# Patient Record
Sex: MALE | Race: WHITE
[De-identification: names, ages, dates, MRNs, and addresses within clinical notes are randomized per-mention and may not be internally consistent; named-entity substitution may affect disease eponyms.]

---

## 2017-04-06 ENCOUNTER — HOSPITAL ENCOUNTER (EMERGENCY)
Dept: HOSPITAL 49 - FER | Age: 77
Discharge: HOME | End: 2017-04-06
Payer: MEDICARE

## 2017-04-06 DIAGNOSIS — Z95.1: ICD-10-CM

## 2017-04-06 DIAGNOSIS — Z79.82: ICD-10-CM

## 2017-04-06 DIAGNOSIS — Z79.02: ICD-10-CM

## 2017-04-06 DIAGNOSIS — I10: Primary | ICD-10-CM

## 2017-04-06 DIAGNOSIS — Z79.899: ICD-10-CM

## 2017-04-06 DIAGNOSIS — J44.9: ICD-10-CM

## 2017-04-06 DIAGNOSIS — I45.2: ICD-10-CM

## 2017-04-06 LAB
ALBUMIN SERPL-MCNC: 4.3 G/DL (ref 3.4–4.8)
ALKALINE PHOSHATASE: 70 U/L (ref 59–141)
ALT SERPL-CCNC: 15 U/L (ref 2–40)
AST: 15 U/L (ref 0–37)
BASOPHIL: 0.6 % (ref 0–2)
BILIRUBIN - TOTAL: 0.3 MG/DL (ref 0.1–1)
BILIRUBIN: NEGATIVE MG/DL
BLOOD: NEGATIVE ERY/UL
BUN SERPL-MCNC: 26 MG/DL (ref 6–25)
BUN/CREAT RATIO (CALC): 17 (ref 12.1–20.1)
CHLORIDE: 104 MMOL/L (ref 98–107)
CK MB SERPL-MCNC: 3.28 NG/ML (ref 0.97–4.94)
CK SERPL-CCNC: 75 U/L (ref 38–174)
CLARITY UR: CLEAR
CO2 (BICARBONATE): 24 MMOL/L (ref 23–33)
COLOR: YELLOW
CREATININE: 1.5 MG/DL (ref 0.7–1.2)
EOSINOPHIL: 4 % (ref 0–7)
GLOBULIN (CALCULATION): 2.5 G/DL (ref 2.2–4.2)
GLUCOSE (U): NORMAL MG/DL
GLUCOSE SERPL-MCNC: 98 MG/DL (ref 83–110)
HCT: 40.9 % (ref 42–52)
HGB BLD-MCNC: 13.1 G/DL (ref 13.2–18)
KETONE (U): NEGATIVE MG/DL
LEUKOCYTES: NEGATIVE LEU/UL
LYMPHOCYTE: 30.2 % (ref 15–48)
MCH RBC QN AUTO: 28.2 PG (ref 25–31)
MCHC RBC AUTO-ENTMCNC: 32 G/DL (ref 32–36)
MCV: 88.1 FL (ref 78–100)
MONOCYTE: 9.3 % (ref 0–12)
MPV: 11.3 FL (ref 6–9.5)
NEUTROPHIL: 55.9 % (ref 41–80)
NITRITE: NEGATIVE MG/DL
PLT: 197 K/UL (ref 150–400)
POTASSIUM: 3.9 MMOL/L (ref 3.5–5.1)
PROTEIN: (no result) MG/DL
RBC MORPHOLOGY: NORMAL
RBC: 4.64 M/UL (ref 4.7–6)
RDW: 15 % (ref 11.5–14)
SPECIFIC GRAVITY: >=1.03 (ref 1–1.03)
SQUAMOUS EPITHELIAL CELLS: (no result)
TOTAL PROTEIN: 6.8 G/DL (ref 6.4–8.3)
TROPONIN T: < 0.01 NG/ML
UROBILINOGEN: 0.2 MG/DL (ref 0.2–1)
WBC: 7.7 K/UL (ref 4–10.5)

## 2018-01-18 ENCOUNTER — OFFICE VISIT CONVERTED (OUTPATIENT)
Dept: FAMILY MEDICINE CLINIC | Age: 78
End: 2018-01-18
Attending: FAMILY MEDICINE

## 2018-06-18 ENCOUNTER — OFFICE VISIT CONVERTED (OUTPATIENT)
Dept: FAMILY MEDICINE CLINIC | Age: 78
End: 2018-06-18
Attending: FAMILY MEDICINE

## 2020-11-10 ENCOUNTER — OFFICE VISIT CONVERTED (OUTPATIENT)
Dept: GASTROENTEROLOGY | Facility: CLINIC | Age: 80
End: 2020-11-10
Attending: NURSE PRACTITIONER

## 2020-11-10 ENCOUNTER — CONVERSION ENCOUNTER (OUTPATIENT)
Dept: GASTROENTEROLOGY | Facility: CLINIC | Age: 80
End: 2020-11-10

## 2021-05-10 NOTE — H&P
History and Physical      Patient Name: Tashi Palma   Patient ID: [id number]   Sex: Male   YOB: 1940        Visit Date: November 10, 2020    Provider: LEON Adkins   Location: [facility]   Location Address: [address] [place] [address], [address]  [place], [place]  [id number]   Location Phone: [phone]          Chief Complaint  · Constipation      History Of Present Illness     Mr. Palma is a 80 year old male with PMH of MI, Alzheimer's and colon cancer who presents for evaluation of constipation.      His daughter is present at today's visit and helps provide history.  She is his primary caregiver.      Constipation has been present for several years. He reports that he has a bowel movement about once per week.  Denies rectal bleeding.  Admits straining and has had to manually remove stool recently.  He is taking 4 stool softeners per day.  Also has episodes of diarrhea and sometimes doesn't make it to the bathroom.  Daughter reports foul smelling stool x 2 years.  States that if he has an accident in clothes, it's impossible to get the smell out.      He denies dyspepsia, however his daughter reports that he belches frequently.      She reports that he had a breath test per PCP and was dx with H.pylori about 6 weeks ago.  Treated per PCP, but hasn't retested.      Colon resection in 2007 secondary to colon cancer.      Previous colonoscopy 6/13/2019 in [place] - moderate diverticulosis, previous surgery in the descending colon.       Past Medical History  Colon cancer; Colon Polyps; MI (myocardial infarction); Varicose vein of leg         Past Surgical History  Appendectomy; Carotid artery stenting; Colonoscopy; Open Heart Surgery; Stent placment         Medication List  amlodipine 5 mg oral tablet; Aspir-81 81 mg oral tablet,delayed release (DR/EC); clopidogrel 75 mg oral tablet; Colace 100 mg oral capsule; Eliquis 2.5 mg oral tablet; lansoprazole 30 mg oral  tablet,disintegrat, delay rel; levothyroxine 50 mcg oral tablet; memantine 5 mg oral tablet; olanzapine 2.5 mg oral tablet; sertraline 50 mg oral tablet; valsartan 160 mg oral tablet         Allergy List  NO KNOWN DRUG ALLERGIES       Allergies Reconciled  Family Medical History  No family history of colorectal cancer         Social History  Tobacco (Never)         Review of Systems  · Constitutional  o Denies  o : chills, fever  · Eyes  o Denies  o : blurred vision, changes in vision  · Cardiovascular  o Denies  o : chest pain, irregular heart beats  · Respiratory  o Denies  o : shortness of breath, cough  · Gastrointestinal  o Admits  o : See HPI  · Genitourinary  o Denies  o : dysuria, blood in urine  · Integument  o Denies  o : rash, new skin lesions  · Neurologic  o Denies  o : tingling or numbness, seizures  · Musculoskeletal  o Denies  o : joint pain, joint swelling  · Endocrine  o Denies  o : weight gain, weight loss  · Psychiatric  o Denies  o : anxiety, depression      Vitals  Date Time BP Position Site L\R Cuff Size HR RR TEMP (F) WT  HT  BMI kg/m2 BSA m2 O2 Sat FR L/min FiO2        11/10/2020 09:07 /59 Sitting    55 - R   200lbs 0oz                Physical Examination  · Constitutional  o Appearance  o : well developed, well-nourished, in no acute distress  · Eyes  o Vision  o :   § Visual Fields  § : eyes move symmetrical in all directions  o Sclerae  o : anicteric  o Pupils and Irises  o : pupils equal and symmetrical  · Neck  o Inspection/Palpation  o : supple  · Respiratory  o Respiratory Effort  o : breathing unlabored  o Inspection of Chest  o : normal appearance, no retractions  o Auscultation of Lungs  o : clear to auscultation bilaterally  · Cardiovascular  o Heart  o :   § Auscultation of Heart  § : no murmurs, gallops or rubs  · Gastrointestinal  o Abdominal Examination  o : soft, nontender to palpation, with normal active bowel sounds, no appreciable hepatosplenomegaly  o Digital  Rectal Exam  o : deferred  · Lymphatic  o Neck  o : no palpable lymphadenopathy  · Skin and Subcutaneous Tissue  o General Inspection  o : without focal lesions; turgor is normal  · Psychiatric  o General  o : Alert and oriented x3  o Mood and Affect  o : Mood and affect are appropriate to circumstances  · Extremities  o Extremities  o : No edema, no cyanosis          Assessment  · Constipation     564.00/K59.00  · Diarrhea     787.91/R19.7  · H. pylori infection     041.86/A04.8  · History of colon resection     V45.89/Z90.49  · History of colon cancer     V10.05/Z85.038      Plan  · Orders  o H pylori breath test (08029, 00989) - - 11/10/2020  · Medications  o Metamucil Sugar-Free (aspart) 3.4 gram/5.8 gram oral powder   SIG: use as directed by oral route daily   DISP: (1) Canister with 5 refills  Prescribed on 11/10/2020     o Florajen3 460 mg (7.5-6- 1.5 bill. cell) oral capsule   SIG: take 1 capsule by oral route daily for 30 days   DISP: (30) Capsule with 2 refills  Prescribed on 11/10/2020     o Medications have been Reconciled  · Instructions  o Information given on current diagnoses. Recommend starting fiber and probiotics. If no improvement, instructed daughter to call and can try rx. H. pylori breath test to ensure resolution.  · Disposition  o Follow up 2 months            Electronically Signed by: LEON Adkins -Author on November 10, 2020 09:17:30 PM

## 2021-05-14 VITALS — SYSTOLIC BLOOD PRESSURE: 189 MMHG | DIASTOLIC BLOOD PRESSURE: 59 MMHG | WEIGHT: 200 LBS | HEART RATE: 55 BPM

## 2021-05-18 NOTE — PROGRESS NOTES
Tashi Palma 1940     Office/Outpatient Visit    Visit Date: Mon, Jun 18, 2018 10:52 am    Provider: Anand Zarate MD (Assistant: Armida Colmenares MA)    Location: [facility], [place]        Electronically signed by Anand Zarate MD on  06/19/2018 07:37:55 AM                             SUBJECTIVE:        CC: blood pressure, thyroid, dementia         HPI:         Dx with hypertension; his current cardiac medication regimen includes a calcium channel blocker ( Norvasc ) and an angiotensin receptor blocker ( Cozaar ).  He did not bring his blood pressure diary, but says that pressures have been okay.  He is tolerating the medication well without side effects.  Compliance with treatment has been good; he takes his medication as directed.          Concerning acquired hypothyroidism, other specified cause, he is currently taking Synthroid, 25 mcg daily.  TSH was last checked 5 months ago.  The result was reported as high.      ROS:     CONSTITUTIONAL:  Negative for chills and fever.      CARDIOVASCULAR:  Negative for chest pain and palpitations.      RESPIRATORY:  Negative for recent cough and dyspnea.      GASTROINTESTINAL:  Negative for abdominal pain, nausea and vomiting.      INTEGUMENTARY:  Negative for atypical mole(s) and rash.          PMH/FMH/SH:     Last Reviewed on 1/18/2018 03:47 PM by Anand Zarate    Past Medical History:         Carotid Artery Stenosis: bilateral;     Hyperlipidemia: Hypercholesterolemia; Hypertriglyceridemia;     Hypertension    Peripheral Vascular Disease     Cerebrovascular Accident: x 1; in 2007;         PREVENTIVE HEALTH MAINTENANCE             COLORECTAL CANCER SCREENING: Up to date (colonoscopy q10y; sigmoidoscopy q5y; Cologuard q3y) was last done 04/2016, Results are in chart; colonoscopy with normal results; colonoscopy with the following abnormalities noted-- Diffuse Diverticulosis; 4/28/2016         Surgical History:         Appendectomy      Veins in  legs;    Partial Colectomy - isolated to a polyp - 2009;    CEA- Left; Procedures: colonoscopy 2010 - next due in 2013         Family History:         Positive for Coronary Artery Disease ( father ).          Social History:     Occupation: Retired (Prior occupation: ) lives with son in law and daughter     Marital Status:      Children: 3 children         Tobacco/Alcohol/Supplements:     Last Reviewed on 6/18/2018 10:52 AM by Armida Colmenares    Tobacco: He has a past history of cigarette smoking; quit date:  1980s.  Non-drinker         Substance Abuse History:     Last Reviewed on 1/18/2018 03:47 PM by Anand Zarate    NEGATIVE         Mental Health History:     Last Reviewed on 1/18/2018 03:47 PM by Anand Zarate        Communicable Diseases (eg STDs):     Last Reviewed on 1/18/2018 03:47 PM by Anand Zarate            Current Problems:     Last Reviewed on 1/18/2018 03:47 PM by Anand Zarate    Low back pain     Malaise and Fatigue     Memory loss     Chronic renal insufficiency, stage 2     Use of high risk medications     Carotid artery stenosis, with cerebral infarction     Moderate depression     Hypercholesterolemia     Hypertension         Immunizations:     Prevnar 13 (Pneumococcal PCV 13) 2/15/2016     Fluzone (3 + years dose) 11/5/2012     Fluzone High-Dose pf (>=65 yr) 10/24/2014     Fluzone High-Dose pf (>=65 yr) 2/15/2016     Fluzone High-Dose pf (>=65 yr) 9/22/2017     PNEUMOVAX 23 (Pneumococcal PPV23) 8/22/2017     Zostavax (Zoster) 7/12/2013         Allergies:     Last Reviewed on 6/18/2018 10:52 AM by Armida Colmenares      No Known Drug Allergies.         Current Medications:     Last Reviewed on 6/18/2018 10:52 AM by Armida Colmenares    Aricept 10mg Tablet Take 1 tablet(s) by mouth daily     Losartan 100mg Tablet Take 1 tablet(s) by mouth daily     Norvasc 5mg Tablet Take 1 tablet(s) by mouth bid     Plavix 75mg Tablet Take 1 tablet(s) by  mouth daily     Sertraline HCl 50mg Tablet Take 1 tablet(s) by mouth daily     Synthroid 0.025mg Tablet One tablet daily     Aspirin (ASA) 81mg Tablets, Enteric Coated Take 1 tablet(s) by mouth daily         OBJECTIVE:        Vitals:         Current: 6/18/2018 10:55:33 AM    Ht:  5 ft, 9 in;  Wt: 198.2 lbs;  BMI: 29.3    T: 99 F (oral);  BP: 155/67 mm Hg (left arm, sitting);  P: 63 bpm (left arm (BP Cuff), sitting);  sCr: 1.6 mg/dL;  GFR: 37.17        Exams:     PHYSICAL EXAM:     GENERAL: Vitals recorded well developed, well nourished;     EYES: extraocular movements intact; conjunctiva and cornea are normal; PERRL;     E/N/T: EARS:  normal external auditory canals and tympanic membranes;  grossly normal hearing; OROPHARYNX:  normal mucosa, dentition, gingiva, and posterior pharynx;     NECK: range of motion is normal; thyroid is non-palpable;     RESPIRATORY: normal respiratory rate and pattern with no distress; normal breath sounds with no rales, rhonchi, wheezes or rubs;     CARDIOVASCULAR: normal rate; rhythm is regular;  no systolic murmur;     GASTROINTESTINAL: nontender; normal bowel sounds; no masses;     SKIN:  no significant rashes or lesions; no suspicious moles;     NEUROLOGIC: mental status: alert and oriented x 3; cranial nerves II-XII grossly intact;         ASSESSMENT           585.2   N18.2  Chronic renal insufficiency, stage 2              DDx:     401.1   I10  Hypertension              DDx:     780.93   R41.82  Memory loss              DDx:     244.8   E03.8  Acquired hypothyroidism, other specified cause              DDx:         ORDERS:         Meds Prescribed:       Refill of: Aricept (Donepezil HCl) 10mg Tablet Take 1 tablet(s) by mouth daily  #90 (Ninety) tablet(s) Refills: 1       Refill of: Losartan 100mg Tablet Take 1 tablet(s) by mouth daily  #90 (Ninety) tablet(s) Refills: 1       Refill of: Norvasc (Amlodipine ) 5mg Tablet Take 1 tablet(s) by mouth bid  #180 ([address])  tablet(s) Refills: 1       Refill of: Plavix (Clopidogrel) 75mg Tablet Take 1 tablet(s) by mouth daily  #90 (Ninety) tablet(s) Refills: 1       Refill of: Sertraline HCl 50mg Tablet Take 1 tablet(s) by mouth daily  #90 (Ninety) tablet(s) Refills: 1         Lab Orders:       [id number]  [address] CBC w/o diff  (Send-Out)         [id number]  COMP - [facility] Comp. Metabolic Panel  (Send-Out)         35208  TSH - [facility] TSH  (Send-Out)                   PLAN:          Chronic renal insufficiency, stage 2     LABORATORY:  Labs ordered to be performed today include CBC W/O DIFF.      RECOMMENDATIONS given include: Today, we have reviewed Tashi's care.  I'm going to continue his current medications and recheck labs as noted below.  No changes are anticipated.  He brings a form with regard to driving.  It is my advice that he not drive, but he would like to move in that direction.  We will refer him to neurology for evaluation at his request..            Orders:       [id number]  [address] CBC w/o diff  (Send-Out)             Patient Education Handouts:       [facility]           Hypertension     LABORATORY:  Labs ordered to be performed today include Comprehensive metabolic panel.            Prescriptions:       Refill of: Losartan 100mg Tablet Take 1 tablet(s) by mouth daily  #90 (Ninety) tablet(s) Refills: 1       Refill of: Norvasc (Amlodipine ) 5mg Tablet Take 1 tablet(s) by mouth bid  #180 ([address]) tablet(s) Refills: 1           Orders:       [id number]  COMP [facility] Comp. Metabolic Panel  (Send-Out)            Memory loss As above.           Prescriptions:       Refill of: Aricept (Donepezil HCl) 10mg Tablet Take 1 tablet(s) by mouth daily  #90 (Ninety) tablet(s) Refills: 1          Acquired hypothyroidism, other specified cause     LABORATORY:  Labs ordered to be performed today include TSH.            Orders:       [id number]  TSH - [facility] TSH  (Send-Out)               Other Prescriptions:       Refill of: Plavix  (Clopidogrel) 75mg Tablet Take 1 tablet(s) by mouth daily  #90 (Ninety) tablet(s) Refills: 1       Refill of: Sertraline HCl 50mg Tablet Take 1 tablet(s) by mouth daily  #90 (Ninety) tablet(s) Refills: 1         CHARGE CAPTURE           **Please note: ICD descriptions below are intended for billing purposes only and may not represent clinical diagnoses**        Primary Diagnosis:         585.2 Chronic renal insufficiency, stage 2            N18.2    Chronic kidney disease, stage 2 (mild)              Orders:          [id number]   Office/outpatient visit; established patient, level 4  (In-House)           401.1 Hypertension            I10    Essential (primary) hypertension    780.93 Memory loss            R41.82    Altered mental status, unspecified    244.8 Acquired hypothyroidism, other specified cause            E03.8    Other specified hypothyroidism        ADDENDUMS:      ____________________________________    Addendum: 06/19/2018 10:00 AM -          Visit Note Faxed to:        Jarret Garcia (Neurology); Number [phone]

## 2021-05-18 NOTE — PROGRESS NOTES
Tashi Palma 1940     Office/Outpatient Visit    Visit Date: Thu, Jan 18, 2018 03:40 pm    Provider: Anand Zarate MD (Assistant: Armida Colmenares MA)    Location: [facility]        Electronically signed by Anand Zarate MD on  01/19/2018 05:29:43 AM                             SUBJECTIVE:        CC: 'we need to talk'         HPI:     Tashi is in today for follow up on a few issues.  He has been having more of an issue with the memory in the last bit.  His daughter is not sure what has contributed to this, but it is becoming more of a practical issue.  He is not driving now.  He did take off one day over [place] break and drove over 200 miles.  He went all the way to [place] and happened to run into a niece there.         Hypercholesterolemia details; current treatment includes Lipitor.  Compliance with treatment has been good; he follows up as directed.  He denies experiencing any hypercholesterolemia related symptoms.          Tashi has had some issue with depression.  He does have some days where his emotions will vary.  His daughter says that he does have 'angry days'.         Dx with hypertension; his current cardiac medication regimen includes a calcium channel blocker ( Norvasc ) and an angiotensin receptor blocker ( Cozaar ).  He did not bring his blood pressure diary, but says that pressures have been okay.  He is tolerating the medication well without side effects.  Compliance with treatment has been good; he takes his medication as directed.      ROS:     CONSTITUTIONAL:  Negative for chills and fever.      CARDIOVASCULAR:  Negative for chest pain and palpitations.      RESPIRATORY:  Negative for recent cough and dyspnea.      GASTROINTESTINAL:  Negative for abdominal pain, nausea and vomiting.      INTEGUMENTARY:  Negative for atypical mole(s) and rash.          PMH/FMH/SH:     Last Reviewed on 1/18/2018 03:47 PM by Anand Zarate    Past Medical History:          Carotid Artery Stenosis: bilateral;     Hyperlipidemia: Hypercholesterolemia; Hypertriglyceridemia;     Hypertension    Peripheral Vascular Disease     Cerebrovascular Accident: x 1; in 2007;         PREVENTIVE HEALTH MAINTENANCE             COLORECTAL CANCER SCREENING: Up to date (colonoscopy q10y; sigmoidoscopy q5y; Cologuard q3y) was last done 04/2016, Results are in chart; colonoscopy with normal results; colonoscopy with the following abnormalities noted-- Diffuse Diverticulosis; 4/28/2016         Surgical History:         Appendectomy      Veins in legs;    Partial Colectomy - isolated to a polyp - 2009;    CEA- Left; Procedures: colonoscopy 2010 - next due in 2013         Family History:         Positive for Coronary Artery Disease ( father ).          Social History:     Occupation: Retired (Prior occupation: ) lives with son in law and daughter     Marital Status:      Children: 3 children         Tobacco/Alcohol/Supplements:     Last Reviewed on 1/18/2018 03:47 PM by Anand Zarate    Tobacco: He has a past history of cigarette smoking; quit date:  1980s.  Non-drinker         Substance Abuse History:     Last Reviewed on 1/18/2018 03:47 PM by Anand Zarate    NEGATIVE         Mental Health History:     Last Reviewed on 1/18/2018 03:47 PM by Anand Zarate        Communicable Diseases (eg STDs):     Last Reviewed on 1/18/2018 03:47 PM by Anand Zarate            Current Problems:     Last Reviewed on 1/18/2018 03:47 PM by Anand Zarate    Low back pain     Malaise and Fatigue     Memory loss     Chronic renal insufficiency, stage 2     Use of high risk medications     Carotid artery stenosis, with cerebral infarction     Moderate depression     Hypercholesterolemia     Hypertension         Immunizations:     Prevnar 13 (Pneumococcal PCV 13) 2/15/2016     Fluzone (3 + years dose) 11/5/2012     Fluzone High-Dose pf (>=65 yr) 10/24/2014     Fluzone  High-Dose pf (>=65 yr) 2/15/2016     Fluzone High-Dose pf (>=65 yr) 9/22/2017     PNEUMOVAX 23 (Pneumococcal PPV23) 8/22/2017     Zostavax (Zoster) 7/12/2013         Allergies:     Last Reviewed on 1/18/2018 03:47 PM by Anand Zarate      No Known Drug Allergies.         Current Medications:     Last Reviewed on 1/18/2018 03:47 PM by Anand Zarate    Losartan 100mg Tablet Take 1 tablet(s) by mouth daily     Norvasc 5mg Tablet Take 1 tablet(s) by mouth bid     Plavix 75mg Tablet Take 1 tablet(s) by mouth daily     Sertraline HCl 50mg Tablet Take 1 tablet(s) by mouth daily     Aspirin (ASA) 81mg Tablets, Enteric Coated Take 1 tablet(s) by mouth daily         OBJECTIVE:        Vitals:         Current: 1/18/2018 3:43:13 PM    Ht:  5 ft, 9 in;  Wt: 204.5 lbs;  BMI: 30.2    T: 97.9 F (oral);  BP: 152/62 mm Hg (left arm, sitting);  P: 57 bpm (left arm (BP Cuff), sitting);  sCr: 1.73 mg/dL;  GFR: 35.39        Exams:     PHYSICAL EXAM:     GENERAL: Vitals recorded well developed, well nourished;     EYES: extraocular movements intact; conjunctiva and cornea are normal; PERRL;     E/N/T: EARS:  normal external auditory canals and tympanic membranes;  grossly normal hearing; OROPHARYNX:  normal mucosa, dentition, gingiva, and posterior pharynx;     NECK: range of motion is normal; thyroid is non-palpable;     RESPIRATORY: normal respiratory rate and pattern with no distress; normal breath sounds with no rales, rhonchi, wheezes or rubs;     CARDIOVASCULAR: normal rate; rhythm is regular;  no systolic murmur;     GASTROINTESTINAL: nontender; normal bowel sounds; no masses;     SKIN:  no significant rashes or lesions; no suspicious moles;     NEUROLOGIC: mental status: oriented to person only;  cranial nerves II-XII grossly intact; Mini-Mental State Exam score is 17/30         ASSESSMENT           780.93   R41.82  Memory loss              DDx:     585.2   N18.2  Chronic renal insufficiency, stage 2               DDx:     272.0   E78.4  Hypercholesterolemia              DDx:     296.22   F32.1  Moderate depression              DDx:     401.1   I10  Hypertension              DDx:     433.11   I63.239  Carotid artery stenosis, with cerebral infarction              DDx:     788.33   N39.46  Mixed urinary incontinence              DDx:         ORDERS:         Meds Prescribed:       Refill of: Losartan 100mg Tablet Take 1 tablet(s) by mouth daily  #90 (Ninety) tablet(s) Refills: 1       Refill of: Norvasc (Amlodipine ) 5mg Tablet Take 1 tablet(s) by mouth bid  #180 (One [place] and Eighty) tablet(s) Refills: 1       Refill of: Plavix (Clopidogrel) 75mg Tablet Take 1 tablet(s) by mouth daily  #90 (Ninety) tablet(s) Refills: 1       Refill of: Sertraline HCl 50mg Tablet Take 1 tablet(s) by mouth daily  #90 (Ninety) tablet(s) Refills: 1       Aricept (Donepezil HCl) 5mg Tablet Take 1 tablet(s) by mouth at bedtime  #30 (Thirty) tablet(s) Refills: 0         Lab Orders:       [id number]  HTNLP - [facility] CMP AND LIPID: [id number]  (Send-Out)         [id number]  TSH - [facility] TSH  (Send-Out)         [id number]  BDUAM - [facility] Urinalysis, automated, with micro  (Send-Out)                   PLAN:          Memory loss         RECOMMENDATIONS given include: Today, we have reviewed Tashi's care.  I do think he has some degree of Alzheimer's disease.  We will begin some treatment aimed in this.  We will see how he responds to this and try to be fairly aggressive.  I do agree that she should NOT drive moving forward.  We will check labs and go from there..            Prescriptions:       Aricept (Donepezil HCl) 5mg Tablet Take 1 tablet(s) by mouth at bedtime  #30 (Thirty) tablet(s) Refills: 0           Patient Education Handouts:       Alzheimer's Disease           Chronic renal insufficiency, stage 2     LABORATORY:  Labs ordered to be performed today include HTN/Lipid Panel: CMP, Lipid.            Orders:       [id number]  [facility]NLP - [facility] CMP AND LIPID: [id number]   (Send-Out)            Hypercholesterolemia     LABORATORY:  Labs ordered to be performed today include TSH.            Prescriptions:       Refill of: Losartan 100mg Tablet Take 1 tablet(s) by mouth daily  #90 (Ninety) tablet(s) Refills: 1       Refill of: Norvasc (Amlodipine ) 5mg Tablet Take 1 tablet(s) by mouth bid  #180 (One [place] and Eighty) tablet(s) Refills: 1           Orders:       [id number]  [facility] TSH  (Send-Out)            Moderate depression As above.           Prescriptions:       Refill of: Sertraline HCl 50mg Tablet Take 1 tablet(s) by mouth daily  #90 (Ninety) tablet(s) Refills: 1          Hypertension As above.          Carotid artery stenosis, with cerebral infarction As above.           Prescriptions:       Refill of: Plavix (Clopidogrel) 75mg Tablet Take 1 tablet(s) by mouth daily  #90 (Ninety) tablet(s) Refills: 1          Mixed urinary incontinence     LABORATORY:  Labs ordered to be performed today include urinalysis with micro.            Orders:       [id number]  [facility] Urinalysis, automated, with micro  (Send-Out)               CHARGE CAPTURE           **Please note: ICD descriptions below are intended for billing purposes only and may not represent clinical diagnoses**        Primary Diagnosis:         780.93 Memory loss            R41.82    Altered mental status, unspecified              Orders:          [id number]   Office/outpatient visit; established patient, level 4  (In-House)           585.2 Chronic renal insufficiency, stage 2            N18.2    Chronic kidney disease, stage 2 (mild)    272.0 Hypercholesterolemia            E78.4    Other hyperlipidemia    296.22 Moderate depression            F32.1    Major depressive disorder, single episode, moderate    401.1 Hypertension            I10    Essential (primary) hypertension    433.11 Carotid artery stenosis, with cerebral infarction            I63.239    Cerebral infarction due to unspecified occlusion or stenosis of unspecified  carotid arteries    788.33 Mixed urinary incontinence            N39.46    Mixed incontinence        ADDENDUMS:      ____________________________________    Addendum: 06/19/2018 10:00 AM -          Visit Note Faxed to:        Jarret Garcia (Neurology); Number [phone]

## 2021-07-01 VITALS
HEART RATE: 57 BPM | SYSTOLIC BLOOD PRESSURE: 152 MMHG | TEMPERATURE: 97.9 F | BODY MASS INDEX: 30.29 KG/M2 | WEIGHT: 204.5 LBS | DIASTOLIC BLOOD PRESSURE: 62 MMHG | HEIGHT: 69 IN

## 2021-07-01 VITALS
BODY MASS INDEX: 29.36 KG/M2 | SYSTOLIC BLOOD PRESSURE: 155 MMHG | DIASTOLIC BLOOD PRESSURE: 67 MMHG | WEIGHT: 198.2 LBS | HEIGHT: 69 IN | TEMPERATURE: 99 F | HEART RATE: 63 BPM

## 2021-10-04 ENCOUNTER — HOSPITAL ENCOUNTER (EMERGENCY)
Dept: HOSPITAL 49 - FER | Age: 81
LOS: 2 days | Discharge: OTHER INSTITUTION | End: 2021-10-06
Payer: MEDICARE

## 2021-10-04 DIAGNOSIS — I25.2: ICD-10-CM

## 2021-10-04 DIAGNOSIS — F03.90: ICD-10-CM

## 2021-10-04 DIAGNOSIS — Z79.01: ICD-10-CM

## 2021-10-04 DIAGNOSIS — Z86.73: ICD-10-CM

## 2021-10-04 DIAGNOSIS — Z79.82: ICD-10-CM

## 2021-10-04 DIAGNOSIS — R00.1: ICD-10-CM

## 2021-10-04 DIAGNOSIS — R55: Primary | ICD-10-CM

## 2021-10-04 DIAGNOSIS — Z20.822: ICD-10-CM

## 2021-10-04 LAB
ALBUMIN SERPL-MCNC: 3.5 G/DL (ref 3.4–5)
ALKALINE PHOSHATASE: 100 U/L (ref 46–116)
ALT SERPL-CCNC: 19 U/L (ref 16–63)
AST: 12 U/L (ref 15–37)
BASOPHIL: 1 % (ref 0–2)
BILIRUBIN - TOTAL: 0.3 MG/DL (ref 0.2–1)
BUN SERPL-MCNC: 30 MG/DL (ref 7–18)
BUN/CREAT RATIO (CALC): 16.5 RATIO
CHLORIDE: 110 MMOL/L (ref 98–107)
CO2 (BICARBONATE): 25 MMOL/L (ref 21–32)
CREATININE: 1.82 MG/DL (ref 0.67–1.17)
EOSINOPHIL: 1.9 % (ref 0–7)
GLOBULIN (CALCULATION): 3.1 G/DL
GLUCOSE SERPL-MCNC: 129 MG/DL (ref 74–106)
HCT: 37.9 % (ref 42–52)
HGB BLD-MCNC: 11.6 G/DL (ref 13.2–18)
LYMPHOCYTE: 16.6 % (ref 15–48)
MCH RBC QN AUTO: 28.7 PG (ref 25–31)
MCHC RBC AUTO-ENTMCNC: 30.6 G/DL (ref 32–36)
MCV: 93.8 FL (ref 78–100)
MONOCYTE: 6.9 % (ref 0–12)
MPV: 11.9 FL (ref 6–9.5)
NEUTROPHIL: 73.5 % (ref 41–80)
NRBC: 0.4
PLT: 155 K/UL (ref 150–400)
POTASSIUM: 3.7 MMOL/L (ref 3.5–5.1)
PRO-BNP: 407 PG/ML (ref ?–450)
RBC MORPHOLOGY: NORMAL
RBC: 4.04 M/UL (ref 4.7–6)
RDW: 15.6 % (ref 11.5–14)
TOTAL PROTEIN: 6.6 G/DL (ref 6.4–8.2)
WBC: 6.9 K/UL (ref 4–10.5)

## 2021-10-04 PROCEDURE — U0002 COVID-19 LAB TEST NON-CDC: HCPCS

## 2021-10-05 LAB
BILIRUBIN: NEGATIVE MG/DL
BLOOD: NEGATIVE ERY/UL
CLARITY UR: CLEAR
COLOR: YELLOW
GLUCOSE (U): NORMAL MG/DL
LEUKOCYTES: NEGATIVE LEU/UL
NITRITE: NEGATIVE MG/DL
PROTEIN: NEGATIVE MG/DL
SPECIFIC GRAVITY: 1.01 (ref 1–1.03)
UROBILINOGEN: 0.2 MG/DL (ref 0.2–1)

## 2021-12-26 ENCOUNTER — HOSPITAL ENCOUNTER (INPATIENT)
Dept: HOSPITAL 49 - FER | Age: 81
LOS: 3 days | Discharge: HOME HEALTH | DRG: 871 | End: 2021-12-29
Attending: INTERNAL MEDICINE | Admitting: INTERNAL MEDICINE
Payer: MEDICARE

## 2021-12-26 VITALS — WEIGHT: 188 LBS | BODY MASS INDEX: 26.92 KG/M2 | HEIGHT: 70 IN

## 2021-12-26 DIAGNOSIS — J12.82: ICD-10-CM

## 2021-12-26 DIAGNOSIS — R65.20: ICD-10-CM

## 2021-12-26 DIAGNOSIS — L89.156: ICD-10-CM

## 2021-12-26 DIAGNOSIS — N18.9: ICD-10-CM

## 2021-12-26 DIAGNOSIS — G93.41: ICD-10-CM

## 2021-12-26 DIAGNOSIS — Z90.49: ICD-10-CM

## 2021-12-26 DIAGNOSIS — G30.9: ICD-10-CM

## 2021-12-26 DIAGNOSIS — Z98.890: ICD-10-CM

## 2021-12-26 DIAGNOSIS — D50.9: ICD-10-CM

## 2021-12-26 DIAGNOSIS — J96.01: ICD-10-CM

## 2021-12-26 DIAGNOSIS — Z79.899: ICD-10-CM

## 2021-12-26 DIAGNOSIS — F02.80: ICD-10-CM

## 2021-12-26 DIAGNOSIS — U07.1: ICD-10-CM

## 2021-12-26 DIAGNOSIS — Z85.038: ICD-10-CM

## 2021-12-26 DIAGNOSIS — I45.2: ICD-10-CM

## 2021-12-26 DIAGNOSIS — R55: ICD-10-CM

## 2021-12-26 DIAGNOSIS — G47.33: ICD-10-CM

## 2021-12-26 DIAGNOSIS — Z79.890: ICD-10-CM

## 2021-12-26 DIAGNOSIS — Z79.01: ICD-10-CM

## 2021-12-26 DIAGNOSIS — I25.10: ICD-10-CM

## 2021-12-26 DIAGNOSIS — Z95.1: ICD-10-CM

## 2021-12-26 DIAGNOSIS — I12.9: ICD-10-CM

## 2021-12-26 DIAGNOSIS — A41.89: Primary | ICD-10-CM

## 2021-12-26 LAB
ALBUMIN SERPL-MCNC: 2.9 G/DL (ref 3.4–5)
ALKALINE PHOSHATASE: 68 U/L (ref 46–116)
ALT SERPL-CCNC: 30 U/L (ref 16–63)
AST: 54 U/L (ref 15–37)
BASOPHIL: 0.3 % (ref 0–2)
BILIRUBIN - TOTAL: 0.4 MG/DL (ref 0.2–1)
BUN SERPL-MCNC: 38 MG/DL (ref 7–18)
BUN/CREAT RATIO (CALC): 21.6 RATIO
CHLORIDE: 106 MMOL/L (ref 98–107)
CO2 (BICARBONATE): 24 MMOL/L (ref 21–32)
CORONAVIRUS 2019 SARS-COV-2: POSITIVE
CREATININE: 1.76 MG/DL (ref 0.67–1.17)
EOSINOPHIL: 0.3 % (ref 0–7)
GLOBULIN (CALCULATION): 3.5 G/DL
GLUCOSE SERPL-MCNC: 96 MG/DL (ref 74–106)
HCT: 34.3 % (ref 42–52)
HGB BLD-MCNC: 10.7 G/DL (ref 13.2–18)
INFLUENZA A NAA: NEGATIVE
LYMPHOCYTE: 11.2 % (ref 15–48)
MCH RBC QN AUTO: 28 PG (ref 25–31)
MCHC RBC AUTO-ENTMCNC: 31.2 G/DL (ref 32–36)
MCV: 89.8 FL (ref 78–100)
MONOCYTE: 6.5 % (ref 0–12)
MPV: 12.1 FL (ref 6–9.5)
NEUTROPHIL: 81.1 % (ref 41–80)
NRBC: 0
PLT: 158 K/UL (ref 150–400)
POTASSIUM: 4 MMOL/L (ref 3.5–5.1)
RBC MORPHOLOGY: NORMAL
RBC: 3.82 M/UL (ref 4.7–6)
RDW: 16 % (ref 11.5–14)
TOTAL PROTEIN: 6.4 G/DL (ref 6.4–8.2)
WBC: 7 K/UL (ref 4–10.5)

## 2021-12-26 PROCEDURE — U0002 COVID-19 LAB TEST NON-CDC: HCPCS

## 2021-12-26 PROCEDURE — C9399 UNCLASSIFIED DRUGS OR BIOLOG: HCPCS

## 2021-12-27 LAB
BACTERIA: (no result)
BILIRUBIN: NEGATIVE MG/DL
BLOOD: (no result) ERY/UL
CLARITY UR: CLEAR
COLOR: YELLOW
GLUCOSE (U): NORMAL MG/DL
LEUKOCYTES: NEGATIVE LEU/UL
NITRITE: NEGATIVE MG/DL
PROTEIN: (no result) MG/DL
SPECIFIC GRAVITY: 1.02 (ref 1–1.03)
SQUAMOUS EPITHELIAL CELLS: (no result)
URINARY RBC: (no result)
URINARY WBC: (no result)
UROBILINOGEN: 0.2 MG/DL (ref 0.2–1)

## 2021-12-27 PROCEDURE — XW033E5 INTRODUCTION OF REMDESIVIR ANTI-INFECTIVE INTO PERIPHERAL VEIN, PERCUTANEOUS APPROACH, NEW TECHNOLOGY GROUP 5: ICD-10-PCS | Performed by: INTERNAL MEDICINE

## 2021-12-27 PROCEDURE — 8E0ZXY6 ISOLATION: ICD-10-PCS | Performed by: INTERNAL MEDICINE

## 2021-12-28 LAB
BASOPHIL: 0.3 % (ref 0–2)
BUN SERPL-MCNC: 30 MG/DL (ref 7–18)
BUN/CREAT RATIO (CALC): 23.3 RATIO
CHLORIDE: 111 MMOL/L (ref 98–107)
CO2 (BICARBONATE): 21 MMOL/L (ref 21–32)
CREATININE: 1.29 MG/DL (ref 0.67–1.17)
EOSINOPHIL: 0 % (ref 0–7)
FOLIC ACID (SERUM): 8.3 NG/ML (ref 8.6–58.9)
GLUCOSE SERPL-MCNC: 127 MG/DL (ref 74–106)
HCT: 32.7 % (ref 42–52)
HGB BLD-MCNC: 9.9 G/DL (ref 13.2–18)
IRON % SATURATION: 21.3 %SAT (ref 20–50)
IRON SERPL-MCNC: 29 UG/DL (ref 65–175)
LYMPHOCYTE: 31.7 % (ref 15–48)
MAGNESIUM SERPL-MCNC: 2.4 MG/DL (ref 1.8–2.4)
MCH RBC QN AUTO: 27.7 PG (ref 25–31)
MCHC RBC AUTO-ENTMCNC: 30.3 G/DL (ref 32–36)
MCV: 91.3 FL (ref 78–100)
MONOCYTE: 7.5 % (ref 0–12)
MPV: 12.3 FL (ref 6–9.5)
NEUTROPHIL: 59.8 % (ref 41–80)
NRBC: 0
PHOSPHORUS: 2.9 MG/DL (ref 2.6–4.7)
PLT: 168 K/UL (ref 150–400)
POTASSIUM: 4.3 MMOL/L (ref 3.5–5.1)
RBC MORPHOLOGY: NORMAL
RBC: 3.58 M/UL (ref 4.7–6)
RDW: 16 % (ref 11.5–14)
RETICULOCYTE COUNT: 0.6 % (ref 1–2)
VITAMIN B12: 373 PG/ML (ref 193–986)
WBC: 3.1 K/UL (ref 4–10.5)

## 2021-12-29 NOTE — NUR
TC TO DAUGHTER, SHE DOES NOT HAVE A HH PREFERENCE.  THE ONLY ACCEPTING PT IS
[facility].  DAUGHTER WAS OK WITH THAT HH.  FAXED INFORMATION TO [facility]
WAITING ON ACCEPTANCE.

## 2024-03-31 ENCOUNTER — APPOINTMENT (OUTPATIENT)
Dept: GENERAL RADIOLOGY | Facility: HOSPITAL | Age: 84
End: 2024-03-31
Payer: MEDICARE

## 2024-03-31 ENCOUNTER — HOSPITAL ENCOUNTER (EMERGENCY)
Facility: HOSPITAL | Age: 84
Discharge: HOME | End: 2024-03-31
Attending: EMERGENCY MEDICINE | Admitting: EMERGENCY MEDICINE
Payer: MEDICARE

## 2024-03-31 ENCOUNTER — APPOINTMENT (OUTPATIENT)
Dept: CT IMAGING | Facility: HOSPITAL | Age: 84
End: 2024-03-31
Payer: MEDICARE

## 2024-03-31 VITALS
WEIGHT: 179.23 LBS | HEART RATE: 70 BPM | HEIGHT: 69 IN | TEMPERATURE: 98.6 F | OXYGEN SATURATION: 97 % | SYSTOLIC BLOOD PRESSURE: 144 MMHG | RESPIRATION RATE: 18 BRPM | BODY MASS INDEX: 26.55 KG/M2 | DIASTOLIC BLOOD PRESSURE: 51 MMHG

## 2024-03-31 DIAGNOSIS — R53.1 GENERALIZED WEAKNESS: ICD-10-CM

## 2024-03-31 DIAGNOSIS — R19.7 DIARRHEA, UNSPECIFIED TYPE: ICD-10-CM

## 2024-03-31 DIAGNOSIS — E86.0 DEHYDRATION: Primary | ICD-10-CM

## 2024-03-31 LAB
ALBUMIN SERPL-MCNC: 3.9 G/DL (ref 3.5–5.2)
ALBUMIN/GLOB SERPL: 1.4 G/DL
ALP SERPL-CCNC: 63 U/L (ref 39–117)
ALT SERPL W P-5'-P-CCNC: 13 U/L (ref 1–41)
ANION GAP SERPL CALCULATED.3IONS-SCNC: 15.4 MMOL/L (ref 5–15)
AST SERPL-CCNC: 25 U/L (ref 1–40)
BASOPHILS # BLD AUTO: 0.06 10*3/MM3 (ref 0–0.2)
BASOPHILS NFR BLD AUTO: 0.9 % (ref 0–1.5)
BILIRUB SERPL-MCNC: 0.2 MG/DL (ref 0–1.2)
BUN SERPL-MCNC: 45 MG/DL (ref 8–23)
BUN/CREAT SERPL: 18.5 (ref 7–25)
CALCIUM SPEC-SCNC: 9.3 MG/DL (ref 8.6–10.5)
CHLORIDE SERPL-SCNC: 108 MMOL/L (ref 98–107)
CO2 SERPL-SCNC: 18.6 MMOL/L (ref 22–29)
CREAT SERPL-MCNC: 2.43 MG/DL (ref 0.76–1.27)
DEPRECATED RDW RBC AUTO: 56.7 FL (ref 37–54)
EGFRCR SERPLBLD CKD-EPI 2021: 25.4 ML/MIN/1.73
EOSINOPHIL # BLD AUTO: 0.2 10*3/MM3 (ref 0–0.4)
EOSINOPHIL NFR BLD AUTO: 2.9 % (ref 0.3–6.2)
ERYTHROCYTE [DISTWIDTH] IN BLOOD BY AUTOMATED COUNT: 16.5 % (ref 12.3–15.4)
GEN 5 2HR TROPONIN T REFLEX: 52 NG/L
GLOBULIN UR ELPH-MCNC: 2.7 GM/DL
GLUCOSE SERPL-MCNC: 158 MG/DL (ref 65–99)
HCT VFR BLD AUTO: 31 % (ref 37.5–51)
HGB BLD-MCNC: 9.5 G/DL (ref 13–17.7)
HOLD SPECIMEN: NORMAL
HOLD SPECIMEN: NORMAL
IMM GRANULOCYTES # BLD AUTO: 0.04 10*3/MM3 (ref 0–0.05)
IMM GRANULOCYTES NFR BLD AUTO: 0.6 % (ref 0–0.5)
LYMPHOCYTES # BLD AUTO: 1.23 10*3/MM3 (ref 0.7–3.1)
LYMPHOCYTES NFR BLD AUTO: 18.1 % (ref 19.6–45.3)
MAGNESIUM SERPL-MCNC: 2.2 MG/DL (ref 1.6–2.4)
MCH RBC QN AUTO: 29.1 PG (ref 26.6–33)
MCHC RBC AUTO-ENTMCNC: 30.6 G/DL (ref 31.5–35.7)
MCV RBC AUTO: 94.8 FL (ref 79–97)
MONOCYTES # BLD AUTO: 0.52 10*3/MM3 (ref 0.1–0.9)
MONOCYTES NFR BLD AUTO: 7.6 % (ref 5–12)
NEUTROPHILS NFR BLD AUTO: 4.76 10*3/MM3 (ref 1.7–7)
NEUTROPHILS NFR BLD AUTO: 69.9 % (ref 42.7–76)
NRBC BLD AUTO-RTO: 1 /100 WBC (ref 0–0.2)
PLATELET # BLD AUTO: 211 10*3/MM3 (ref 140–450)
PMV BLD AUTO: 11.2 FL (ref 6–12)
POTASSIUM SERPL-SCNC: 4.6 MMOL/L (ref 3.5–5.2)
PROT SERPL-MCNC: 6.6 G/DL (ref 6–8.5)
RBC # BLD AUTO: 3.27 10*6/MM3 (ref 4.14–5.8)
SODIUM SERPL-SCNC: 142 MMOL/L (ref 136–145)
TROPONIN T DELTA: -6 NG/L
TROPONIN T SERPL HS-MCNC: 58 NG/L
WBC NRBC COR # BLD AUTO: 6.81 10*3/MM3 (ref 3.4–10.8)
WHOLE BLOOD HOLD COAG: NORMAL
WHOLE BLOOD HOLD SPECIMEN: NORMAL

## 2024-03-31 PROCEDURE — 96360 HYDRATION IV INFUSION INIT: CPT

## 2024-03-31 PROCEDURE — 99284 EMERGENCY DEPT VISIT MOD MDM: CPT

## 2024-03-31 PROCEDURE — 93005 ELECTROCARDIOGRAM TRACING: CPT | Performed by: EMERGENCY MEDICINE

## 2024-03-31 PROCEDURE — 71045 X-RAY EXAM CHEST 1 VIEW: CPT

## 2024-03-31 PROCEDURE — 25810000003 SODIUM CHLORIDE 0.9 % SOLUTION: Performed by: EMERGENCY MEDICINE

## 2024-03-31 PROCEDURE — 93005 ELECTROCARDIOGRAM TRACING: CPT

## 2024-03-31 PROCEDURE — 83735 ASSAY OF MAGNESIUM: CPT

## 2024-03-31 PROCEDURE — 85025 COMPLETE CBC W/AUTO DIFF WBC: CPT | Performed by: EMERGENCY MEDICINE

## 2024-03-31 PROCEDURE — 70450 CT HEAD/BRAIN W/O DYE: CPT

## 2024-03-31 PROCEDURE — 80053 COMPREHEN METABOLIC PANEL: CPT

## 2024-03-31 PROCEDURE — 84484 ASSAY OF TROPONIN QUANT: CPT | Performed by: EMERGENCY MEDICINE

## 2024-03-31 PROCEDURE — 36415 COLL VENOUS BLD VENIPUNCTURE: CPT

## 2024-03-31 RX ORDER — BISOPROLOL FUMARATE 5 MG/1
2.5 TABLET, FILM COATED ORAL DAILY
COMMUNITY
Start: 2023-11-28 | End: 2024-04-28

## 2024-03-31 RX ORDER — VALSARTAN 80 MG/1
80 TABLET ORAL DAILY
COMMUNITY
Start: 2023-08-16 | End: 2024-08-15

## 2024-03-31 RX ORDER — LEVOTHYROXINE SODIUM 0.05 MG/1
50 TABLET ORAL DAILY
COMMUNITY
Start: 2023-11-03

## 2024-03-31 RX ORDER — FINASTERIDE 5 MG/1
5 TABLET, FILM COATED ORAL DAILY
COMMUNITY
Start: 2023-11-22 | End: 2024-04-09

## 2024-03-31 RX ORDER — MEMANTINE HYDROCHLORIDE 5 MG/1
28 TABLET ORAL DAILY
COMMUNITY

## 2024-03-31 RX ORDER — ASPIRIN 81 MG/1
81 TABLET ORAL DAILY
COMMUNITY

## 2024-03-31 RX ORDER — SODIUM CHLORIDE 0.9 % (FLUSH) 0.9 %
10 SYRINGE (ML) INJECTION AS NEEDED
Status: DISCONTINUED | OUTPATIENT
Start: 2024-03-31 | End: 2024-04-01 | Stop reason: HOSPADM

## 2024-03-31 RX ORDER — APIXABAN 2.5 MG/1
2.5 TABLET, FILM COATED ORAL EVERY 12 HOURS SCHEDULED
COMMUNITY
Start: 2023-11-03

## 2024-03-31 RX ORDER — ROSUVASTATIN CALCIUM 40 MG/1
40 TABLET, COATED ORAL DAILY
COMMUNITY
Start: 2024-01-10 | End: 2024-04-09

## 2024-03-31 RX ORDER — TAMSULOSIN HYDROCHLORIDE 0.4 MG/1
1 CAPSULE ORAL DAILY
COMMUNITY
Start: 2024-02-22

## 2024-03-31 RX ORDER — OLANZAPINE 5 MG/1
5 TABLET ORAL DAILY
COMMUNITY
Start: 2023-11-04

## 2024-03-31 RX ORDER — DONEPEZIL HYDROCHLORIDE 10 MG/1
5 TABLET, FILM COATED ORAL DAILY
COMMUNITY
Start: 2023-11-17

## 2024-03-31 RX ADMIN — SODIUM CHLORIDE 1000 ML: 9 INJECTION, SOLUTION INTRAVENOUS at 21:14

## 2024-04-01 LAB
QT INTERVAL: 456 MS
QTC INTERVAL: 492 MS

## 2024-04-01 NOTE — ED PROVIDER NOTES
Time: 8:40 PM EDT  Date of encounter:  3/31/2024  Independent Historian/Clinical History and Information was obtained by:   Patient and Family    History is limited by: Dementia    Chief Complaint: Syncope/weakness      History of Present Illness:  Patient is a 83 y.o. year old male who presents to the emergency department for evaluation of syncope/weakness episode.  Patient has a history of kidney disease, Alzheimer's disease, coronary disease who presents with a syncopal/generalized weakness episode.  Reports that he had some diarrhea over the last 24 to 48 hours.  States he stooled on himself and got in the shower and then got weak and lowered down to the ground.  Was partly incoherent for a little bit and then started to come around.  Is on Eliquis.  Family reports that he is improving at this time.  Denies any other complaints this time.    HPI    Patient Care Team  Primary Care Provider: Provider, No Known    Past Medical History:     No Known Allergies  Past Medical History:   Diagnosis Date    A-fib     Alzheimer disease     CAD (coronary artery disease)     Disease of thyroid gland     Kidney disease, chronic, stage III (GFR 30-59 ml/min)     Pacemaker      History reviewed. No pertinent surgical history.  History reviewed. No pertinent family history.    Home Medications:  Prior to Admission medications    Medication Sig Start Date End Date Taking? Authorizing Provider   bisoprolol (ZEBeta) 5 MG tablet Take 0.5 tablets by mouth Daily. 11/28/23 4/28/24 Yes ProviderKayla MD   donepezil (ARICEPT) 10 MG tablet Take 0.5 tablets by mouth Daily. 11/17/23  Yes ProviderKayla MD   Eliquis 2.5 MG tablet tablet Take 1 tablet by mouth Every 12 (Twelve) Hours. 11/3/23  Yes ProviderKayla MD   finasteride (PROSCAR) 5 MG tablet Take 1 tablet by mouth Daily. 11/22/23 4/9/24 Yes ProviderKayla MD   levothyroxine (SYNTHROID, LEVOTHROID) 50 MCG tablet Take 1 tablet by mouth Daily. 11/3/23  Yes  "ProviderKayla MD   OLANZapine (zyPREXA) 5 MG tablet Take 1 tablet by mouth Daily. 11/4/23  Yes Kayla Parks MD   rosuvastatin (CRESTOR) 40 MG tablet Take 1 tablet by mouth Daily. 1/10/24 4/9/24 Yes Kayla Parks MD   sertraline (ZOLOFT) 50 MG tablet Take 1 tablet by mouth Daily. 11/3/23  Yes Kayla Parks MD   tamsulosin (FLOMAX) 0.4 MG capsule 24 hr capsule Take 1 capsule by mouth Daily. 2/22/24  Yes Kayla Parks MD   valsartan (DIOVAN) 80 MG tablet Take 1 tablet by mouth Daily. 8/16/23 8/15/24 Yes Kayla Parks MD   aspirin 81 MG EC tablet Take 1 tablet by mouth Daily.    Provider, MD Kayla   memantine (NAMENDA) 5 MG tablet Take 28 mg by mouth Daily.    Provider, MD Kayla        Social History:          Review of Systems:  Review of Systems   Neurological:  Positive for syncope and weakness.        Physical Exam:  /59   Pulse 70   Temp 98.6 °F (37 °C) (Oral)   Resp 18   Ht 175.3 cm (69\")   Wt 81.3 kg (179 lb 3.7 oz)   SpO2 96%   BMI 26.47 kg/m²     Physical Exam  Vitals and nursing note reviewed.   Constitutional:       Appearance: Normal appearance.   HENT:      Head: Normocephalic and atraumatic.   Eyes:      General: No scleral icterus.  Cardiovascular:      Rate and Rhythm: Normal rate and regular rhythm.      Heart sounds: Normal heart sounds.   Pulmonary:      Effort: Pulmonary effort is normal.      Breath sounds: Normal breath sounds.   Abdominal:      Palpations: Abdomen is soft.      Tenderness: There is no abdominal tenderness.   Musculoskeletal:         General: Normal range of motion.      Cervical back: Normal range of motion.   Skin:     Findings: No rash.   Neurological:      General: No focal deficit present.      Mental Status: He is alert. Mental status is at baseline.                  Procedures:  Procedures      Medical Decision Making:      Comorbidities that affect care:    Chronic Kidney Disease, Coronary Artery " Disease, Hypertension    External Notes reviewed:    Reviewed cardiology note from 2/22/2024      The following orders were placed and all results were independently analyzed by me:  Orders Placed This Encounter   Procedures    XR Chest 1 View    CT Head Without Contrast    [facility] Draw    Comprehensive Metabolic Panel    Single High Sensitivity Troponin T    Magnesium    CBC Auto Differential    High Sensitivity Troponin T 2Hr    NPO Diet NPO Type: Strict NPO    Undress & Gown    Continuous Pulse Oximetry    Vital Signs    Oxygen Therapy- Nasal Cannula; Titrate 1-6 LPM Per SpO2; 90 - 95%    ECG 12 Lead ED Triage Standing Order; Weak / Dizzy / AMS    Insert Peripheral IV    Fall Precautions    CBC & Differential    Green Top (Gel)    Lavender Top    Gold Top - SST    Light Blue Top       Medications Given in the Emergency Department:  Medications   sodium chloride 0.9 % flush 10 mL (has no administration in time range)   sodium chloride 0.9 % bolus 1,000 mL (1,000 mL Intravenous New Bag 3/31/24 2114)        ED Course:    ED Course as of 03/31/24 2217   Sun Mar 31, 2024   2144 EKG interpreted by me  Time: 1952  Heart rate 70  Sinus, right bundle branch block, nonspecific ST changes. [MA]   2203 Spoke with family and patient.  Patient has had significant improvement here in the emergency room.  States he feels better than he did.  Patient wished to be discharged at this time.  Discussed workup with family.  They are comfortable taking him home. [MA]      ED Course User Index  [MA] Jelani Brown MD       Labs:    Lab Results (last 24 hours)       Procedure Component Value Units Date/Time    CBC & Differential [[id number]]  (Abnormal) Collected: 03/31/24 1941    Specimen: Blood Updated: 03/31/24 1946    Narrative:      The following orders were created for panel order CBC & Differential.  Procedure                               Abnormality         Status                     ---------                                -----------         ------                     CBC Auto Differential[[id number]]        Abnormal            Final result                 Please view results for these tests on the individual orders.    Comprehensive Metabolic Panel [[id number]]  (Abnormal) Collected: 03/31/24 1941    Specimen: Blood Updated: 03/31/24 2004     Glucose 158 mg/dL      BUN 45 mg/dL      Creatinine 2.43 mg/dL      Sodium 142 mmol/L      Potassium 4.6 mmol/L      Chloride 108 mmol/L      CO2 18.6 mmol/L      Calcium 9.3 mg/dL      Total Protein 6.6 g/dL      Albumin 3.9 g/dL      ALT (SGPT) 13 U/L      AST (SGOT) 25 U/L      Alkaline Phosphatase 63 U/L      Total Bilirubin 0.2 mg/dL      Globulin 2.7 gm/dL      A/G Ratio 1.4 g/dL      BUN/Creatinine Ratio 18.5     Anion Gap 15.4 mmol/L      eGFR 25.4 mL/min/1.73     Narrative:      GFR Normal >60  Chronic Kidney Disease <60  Kidney Failure <15    The GFR formula is only valid for adults with stable renal function between ages 18 and 70.    Single High Sensitivity Troponin T [[id number]]  (Abnormal) Collected: 03/31/24 1941    Specimen: Blood Updated: 03/31/24 2019     HS Troponin T 58 ng/L     Narrative:      High Sensitive Troponin T Reference Range:  <14.0 ng/L- Negative Female for AMI  <22.0 ng/L- Negative Male for AMI  >=14 - Abnormal Female indicating possible myocardial injury.  >=22 - Abnormal Male indicating possible myocardial injury.   Clinicians would have to utilize clinical acumen, EKG, Troponin, and serial changes to determine if it is an Acute Myocardial Infarction or myocardial injury due to an underlying chronic condition.         Magnesium [[id number]]  (Normal) Collected: 03/31/24 1941    Specimen: Blood Updated: 03/31/24 2004     Magnesium 2.2 mg/dL     CBC Auto Differential [[id number]]  (Abnormal) Collected: 03/31/24 1941    Specimen: Blood Updated: 03/31/24 1946     WBC 6.81 10*3/mm3      RBC 3.27 10*6/mm3      Hemoglobin 9.5 g/dL      Hematocrit 31.0 %      MCV 94.8 fL       MCH 29.1 pg      MCHC 30.6 g/dL      RDW 16.5 %      RDW-SD 56.7 fl      MPV 11.2 fL      Platelets 211 10*3/mm3      Neutrophil % 69.9 %      Lymphocyte % 18.1 %      Monocyte % 7.6 %      Eosinophil % 2.9 %      Basophil % 0.9 %      Immature Grans % 0.6 %      Neutrophils, Absolute 4.76 10*3/mm3      Lymphocytes, Absolute 1.23 10*3/mm3      Monocytes, Absolute 0.52 10*3/mm3      Eosinophils, Absolute 0.20 10*3/mm3      Basophils, Absolute 0.06 10*3/mm3      Immature Grans, Absolute 0.04 10*3/mm3      nRBC 1.0 /100 WBC     High Sensitivity Troponin T 2Hr [[id number]]  (Abnormal) Collected: 03/31/24 2133    Specimen: Blood Updated: 03/31/24 2157     HS Troponin T 52 ng/L      Troponin T Delta -6 ng/L     Narrative:      High Sensitive Troponin T Reference Range:  <14.0 ng/L- Negative Female for AMI  <22.0 ng/L- Negative Male for AMI  >=14 - Abnormal Female indicating possible myocardial injury.  >=22 - Abnormal Male indicating possible myocardial injury.   Clinicians would have to utilize clinical acumen, EKG, Troponin, and serial changes to determine if it is an Acute Myocardial Infarction or myocardial injury due to an underlying chronic condition.                  Imaging:    CT Head Without Contrast    Result Date: 3/31/2024  CT HEAD WO CONTRAST-  HISTORY: Fall today with posterior head pain. History of stroke. Syncope/presyncope.  TECHNIQUE: Axial unenhanced head CT with multiplanar reformats. Radiation dose reduction techniques included automated exposure control or exposure modulation based on body size.  COMPARISON: None.  FINDINGS: Generalized atrophy is present. Chronic small vessel ischemic changes are noted in the white matter. There are old bilateral basal ganglia lacunar infarcts. The lateral and third ventricles appear enlarged. This may be due to central atrophy or normal pressure hydrocephalus. No acute infarct or hemorrhage is identified. There are no masses. No skull fracture identified.  Atherosclerotic calcifications are noted in the carotid siphons and distal vertebral arteries.      Senescent changes without acute abnormality. Ventriculomegaly may be secondary to central atrophy or NPH.    Electronically Signed By-Dr. Aquiles Ybarra MD On:3/31/2024 9:00 PM      XR Chest 1 View    Result Date: 3/31/2024  AP PORTABLE CHEST  HISTORY: Weakness and dizziness.  COMPARISON: None available.  TECHNIQUE: AP portable chest x-ray.  FINDINGS: There is mild cardiomegaly status post CABG and pacer placement. There is bibasilar scarring versus atelectasis, and lung volumes are low. Pulmonary vascularity is normal. No pneumothorax. Atherosclerotic disease noted in the aorta.      Low lung volumes with bibasilar scarring or atelectasis. Postoperative heart.  Electronically Signed By-Dr. Aquiles Ybarra MD On:3/31/2024 8:10 PM         Differential Diagnosis and Discussion:    Weakness: Based on the patient's history, signs, and symptoms, the diffential diagnosis includes but is not limited to meningitis, stroke, sepsis, subarachnoid hemorrhage, intracranial bleeding, encephalitis, acute uti, dehydration, MS, myasthenia gravis, Guillan [place], migraine variant, neuromuscular disorders vertigo, electrolyte imbalance, and metabolic disorders.    All labs were reviewed and interpreted by me.  All X-rays impressions were independently interpreted by me.  EKG was interpreted by me.  CT scan radiology impression was interpreted by me.    MDM     Amount and/or Complexity of Data Reviewed  Decide to obtain previous medical records or to obtain history from someone other than the patient: yes       Patient is a 83-year-old gentleman who has multiple medical problems who presents with generalized weakness/syncopal episode.  Has had multiple episodes of diarrhea over the last couple days.  Clinically appears to be dehydrated with slight elevation in the creatinine.  He has had improvement with fluids here.  He has negative  head scan for acute changes and most of his other labs appear to be chronic in nature.  He has a downtrending troponin.  He states that he feels much better at this time and family reports that he is back to his baseline.  Discussed outpatient follow-up with the family.  Should he have new or worsening symptoms return to the emergency room.  Will DC home with follow-up as an outpatient          Patient Care Considerations:          Consultants/Shared Management Plan:    None    Social Determinants of Health:    Patient has presented with family members who are responsible, reliable and will ensure follow up care.      Disposition and Care Coordination:    Discharged: I considered escalation of care by admitting this patient to the hospital, however patient has improved significantly.    I have explained the patient´s condition, diagnoses and treatment plan based on the information available to me at this time. I have answered questions and addressed any concerns. The patient has a good  understanding of the patient´s diagnosis, condition, and treatment plan as can be expected at this point. The vital signs have been stable. The patient´s condition is stable and appropriate for discharge from the emergency department.      The patient will pursue further outpatient evaluation with the primary care physician or other designated or consulting physician as outlined in the discharge instructions. They are agreeable to this plan of care and follow-up instructions have been explained in detail. The patient has received these instructions in written format and have expressed an understanding of the discharge instructions. The patient is aware that any significant change in condition or worsening of symptoms should prompt an immediate return to this or the closest emergency department or call to 911.      Final diagnoses:   Dehydration   Diarrhea, unspecified type   Generalized weakness        ED Disposition       ED  Disposition   Discharge    Condition   Stable    Comment   --               This medical record created using voice recognition software.             Jelani Brown MD  03/31/24 [id number]